# Patient Record
(demographics unavailable — no encounter records)

---

## 2017-03-12 NOTE — REP
REASON: Low back pain.

 

PRIORS:  None.

 

FINDINGS:  Five views of the lumbosacral spine show no acute fracture,

dislocation or subluxation. The intervertebral disc spaces are symmetric and well

maintained.  There is no spondylolisthesis.  The pedicles are intact bilaterally

and there is no destructive osseous lesions.

 

IMPRESSION:

 

Unremarkable lumbosacral spine series.

 

 

Signed by

Marino Soler DO 03/12/2017 03:18 P